# Patient Record
Sex: MALE | Race: BLACK OR AFRICAN AMERICAN | NOT HISPANIC OR LATINO | Employment: OTHER | URBAN - METROPOLITAN AREA
[De-identification: names, ages, dates, MRNs, and addresses within clinical notes are randomized per-mention and may not be internally consistent; named-entity substitution may affect disease eponyms.]

---

## 2024-11-08 ENCOUNTER — OFFICE VISIT (OUTPATIENT)
Dept: URGENT CARE | Facility: PHYSICIAN GROUP | Age: 49
End: 2024-11-08

## 2024-11-08 VITALS
HEART RATE: 74 BPM | TEMPERATURE: 97.9 F | SYSTOLIC BLOOD PRESSURE: 138 MMHG | RESPIRATION RATE: 16 BRPM | OXYGEN SATURATION: 99 % | HEIGHT: 72 IN | BODY MASS INDEX: 39.39 KG/M2 | DIASTOLIC BLOOD PRESSURE: 82 MMHG | WEIGHT: 290.8 LBS

## 2024-11-08 DIAGNOSIS — Z76.89 ENCOUNTER TO ESTABLISH CARE: ICD-10-CM

## 2024-11-08 DIAGNOSIS — Z02.89 ENCOUNTER FOR EXAMINATION REQUIRED BY DEPARTMENT OF TRANSPORTATION (DOT): ICD-10-CM

## 2024-11-08 PROCEDURE — 7100 PR DOT PHYSICAL: Performed by: PHYSICIAN ASSISTANT

## 2024-11-09 NOTE — PROGRESS NOTES
"Subjective:   Jesus Hale is a 48 y.o. male who presents for Employment Physical ([employer])     Patient presents for a DOT physical exam. Refer to paper documentation scanned into electronic health record under \"Media.\"        See scanned history and physical.  Patient denies any history of seizures, dialysis, insulin use, pacemaker/defibrillator, syncope, arrhythmias/murmurs, vertigo/meniere's disease, illicit drug use, regular sedating medication use, ETOH abuse, or any weakness/paresthesias to extremities.          Medications:  This patient does not have an active medication from one of the medication groupers.    Allergies:             Patient has no allergy information on record.    Surgical History:       No past surgical history on file.    Past Social Hx:  Jesus Hale  reports that he has been smoking cigarettes and cigars. He has never used smokeless tobacco. He reports current alcohol use of about 1.8 oz of alcohol per week. He reports that he does not use drugs.     Past Family Hx:   Jesus Hale family history is not on file.       Problem list, medications, and allergies reviewed by myself today in Epic.     Objective:     /82 (BP Location: Right arm, Patient Position: Sitting, BP Cuff Size: Adult long)   Pulse 74   Temp 36.6 °C (97.9 °F) (Temporal)   Resp 16   Ht 1.84 m (6' 0.44\")   Wt (!) 132 kg (290 lb 12.8 oz)   SpO2 99%   BMI 38.96 kg/m²     Physical Exam  See scanned in media  Assessment/Plan:     Diagnosis and Associated Orders:     1. Encounter to establish care  - Referral to establish with PCP    2. Encounter for examination required by Department of Transportation (DOT)        Comments/MDM:  See scanned history and physical.  Patient denies any history of seizures, dialysis, insulin use, pacemaker/defibrillator, syncope, arrhythmias/murmurs, vertigo/meniere's disease, illicit drug use, regular sedating medication use, ETOH abuse, or any weakness/paresthesias to " extremities. Certified for two years without restrictions.       I personally reviewed prior external notes and test results pertinent to today's visit. Supportive care, natural history, differential diagnoses, and indications for immediate follow-up discussed. Return to clinic or go to ED if symptoms worsen or persist.  Red flag symptoms discussed.  Patient/Parent/Guardian voices understanding. Follow-up with your primary care provider in 3-5 days.  All side effects of medication discussed including allergic response, GI upset, tendon injury, rash, sedation etc    Please note that this dictation was created using voice recognition software. I have made a reasonable attempt to correct obvious errors, but I expect that there are errors of grammar and possibly content that I did not discover before finalizing the note.    This note was electronically signed by Juliana Cintron PA-C

## 2025-06-05 ENCOUNTER — TELEPHONE (OUTPATIENT)
Dept: HEALTH INFORMATION MANAGEMENT | Facility: OTHER | Age: 50
End: 2025-06-05